# Patient Record
Sex: MALE | URBAN - METROPOLITAN AREA
[De-identification: names, ages, dates, MRNs, and addresses within clinical notes are randomized per-mention and may not be internally consistent; named-entity substitution may affect disease eponyms.]

---

## 2017-04-06 ENCOUNTER — OUTPATIENT (OUTPATIENT)
Dept: OUTPATIENT SERVICES | Facility: HOSPITAL | Age: 52
LOS: 1 days | End: 2017-04-06

## 2017-04-18 ENCOUNTER — OUTPATIENT (OUTPATIENT)
Dept: OUTPATIENT SERVICES | Facility: HOSPITAL | Age: 52
LOS: 1 days | End: 2017-04-18

## 2020-02-26 PROBLEM — Z00.00 ENCOUNTER FOR PREVENTIVE HEALTH EXAMINATION: Status: ACTIVE | Noted: 2020-02-26

## 2020-02-27 ENCOUNTER — APPOINTMENT (OUTPATIENT)
Dept: PULMONOLOGY | Facility: CLINIC | Age: 55
End: 2020-02-27
Payer: COMMERCIAL

## 2020-02-27 VITALS
SYSTOLIC BLOOD PRESSURE: 140 MMHG | BODY MASS INDEX: 37.15 KG/M2 | WEIGHT: 248 LBS | DIASTOLIC BLOOD PRESSURE: 84 MMHG | RESPIRATION RATE: 16 BRPM | HEIGHT: 68.5 IN | HEART RATE: 65 BPM | OXYGEN SATURATION: 98 %

## 2020-02-27 DIAGNOSIS — Z02.82 ENCOUNTER FOR ADOPTION SERVICES: ICD-10-CM

## 2020-02-27 DIAGNOSIS — F17.200 NICOTINE DEPENDENCE, UNSPECIFIED, UNCOMPLICATED: ICD-10-CM

## 2020-02-27 DIAGNOSIS — Z12.9 ENCOUNTER FOR SCREENING FOR MALIGNANT NEOPLASM, SITE UNSPECIFIED: ICD-10-CM

## 2020-02-27 DIAGNOSIS — R06.02 SHORTNESS OF BREATH: ICD-10-CM

## 2020-02-27 DIAGNOSIS — Z85.51 PERSONAL HISTORY OF MALIGNANT NEOPLASM OF BLADDER: ICD-10-CM

## 2020-02-27 PROCEDURE — 94664 DEMO&/EVAL PT USE INHALER: CPT | Mod: 59

## 2020-02-27 PROCEDURE — 94060 EVALUATION OF WHEEZING: CPT

## 2020-02-27 PROCEDURE — G0296 VISIT TO DETERM LDCT ELIG: CPT

## 2020-02-27 PROCEDURE — 94727 GAS DIL/WSHOT DETER LNG VOL: CPT

## 2020-02-27 PROCEDURE — 94729 DIFFUSING CAPACITY: CPT

## 2020-02-27 PROCEDURE — 85018 HEMOGLOBIN: CPT | Mod: QW

## 2020-02-27 PROCEDURE — 99204 OFFICE O/P NEW MOD 45 MIN: CPT | Mod: 25

## 2020-02-27 NOTE — PHYSICAL EXAM
[No Acute Distress] : no acute distress [Normal Oropharynx] : normal oropharynx [Normal Rate/Rhythm] : normal rate/rhythm [No Neck Mass] : no neck mass [Normal Appearance] : normal appearance [Normal S1, S2] : normal s1, s2 [No Resp Distress] : no resp distress [No Murmurs] : no murmurs [Clear to Auscultation Bilaterally] : clear to auscultation bilaterally [No Abnormalities] : no abnormalities [Normal Gait] : normal gait [Benign] : benign [No Clubbing] : no clubbing [No Edema] : no edema [No Cyanosis] : no cyanosis [FROM] : FROM [No Focal Deficits] : no focal deficits [Normal Color/ Pigmentation] : normal color/ pigmentation [Oriented x3] : oriented x3 [Normal Affect] : normal affect

## 2020-02-27 NOTE — HISTORY OF PRESENT ILLNESS
[TextBox_4] : 59 y old male with a history of bladder cancer in the distant past, greater than 50-pack-year history of cigarette smoking discontinued approximately 7 years ago. Currently vaping nicotine, seen today for pulmonary evaluation. Patient is complaining of mild shortness of breath with no seasonal or daytime variation. History is negative for chest pains, palpitations, lightheadedness, dizziness. He notes slight intermittent wheeze, which spontaneously resolves. He has not required the use of an inhaler steroids. History is negative for leg edema, paroxysmal nocturnal dyspnea, or orthopnea. No history of weight, change, or changes in voice. History is negative for exposures or change in environment

## 2020-02-27 NOTE — CONSULT LETTER
[Dear  ___] : Dear  [unfilled], [Consult Letter:] : I had the pleasure of evaluating your patient, [unfilled]. [Consult Closing:] : Thank you very much for allowing me to participate in the care of this patient.  If you have any questions, please do not hesitate to contact me. [Please see my note below.] : Please see my note below. [Sincerely,] : Sincerely, [FreeTextEntry3] : Paul Orta MD FCCP\par Pulmonary/Critical Care/Sleep Medicine\par Department of Internal Medicine\par \par [employer]\par

## 2020-02-27 NOTE — ASSESSMENT
[FreeTextEntry1] : Due to the patient's history of prior tobacco use they fulfill criteria for low dose, lung cancer screening. This method was described to the patient with criteria for greater than 30 pack years of smoking, over the age of 55, and screening for 15 years following cessation of tobacco use.\par

## 2020-03-11 ENCOUNTER — FORM ENCOUNTER (OUTPATIENT)
Age: 55
End: 2020-03-11

## 2020-03-12 ENCOUNTER — OUTPATIENT (OUTPATIENT)
Dept: OUTPATIENT SERVICES | Facility: HOSPITAL | Age: 55
LOS: 1 days | End: 2020-03-12
Payer: COMMERCIAL

## 2020-03-12 ENCOUNTER — APPOINTMENT (OUTPATIENT)
Dept: CT IMAGING | Facility: CLINIC | Age: 55
End: 2020-03-12

## 2020-03-12 VITALS — BODY MASS INDEX: 35.96 KG/M2 | HEIGHT: 68.5 IN | WEIGHT: 240 LBS

## 2020-03-12 DIAGNOSIS — Z12.9 ENCOUNTER FOR SCREENING FOR MALIGNANT NEOPLASM, SITE UNSPECIFIED: ICD-10-CM

## 2020-03-12 DIAGNOSIS — F17.210 NICOTINE DEPENDENCE, CIGARETTES, UNCOMPLICATED: ICD-10-CM

## 2020-03-12 DIAGNOSIS — Z87.891 PERSONAL HISTORY OF NICOTINE DEPENDENCE: ICD-10-CM

## 2020-03-12 PROCEDURE — G0297: CPT | Mod: 26

## 2020-03-12 PROCEDURE — G0297: CPT

## 2020-03-12 NOTE — PLAN
[Smoking Cessation Guidance Provided] : Smoking cessation guidance was provided to patient [FreeTextEntry1] : - Low Dose CT chest for lung cancer screening.\par \par - Encouraged continued smoking abstinence\par \par Engaged in share decision making with Mr. CHICAS.  Answered all questions.  He verbalized understanding and agreement.  He knows to call back with any questions or concerns.\par \par

## 2020-03-12 NOTE — HISTORY OF PRESENT ILLNESS
[TextBox_13] : Referred by Dr. Paul Orta.\par \par Mr. CHICAS is a 55 year old male with history of bladder cancer () s/p TURBT.\par \par He was seen in the office today for review of eligibility for, as well as, Low-Dose CT lung cancer screening.  Reviewed and confirmed that the patient meets screening eligibility criteria:\par \par 55 years old \par \par Smoking Status: Former Smoker\par \par Number of pack(s) per day: 2 ppd x 25 years, 1 ppd x 18 years\par Number of years smoked: 43\par Number of pack years smokin\par Patient currently vaping for the last 4 years.  Patient quit 2 weeks ago. \par Quit Year: \par \par Mr. CHICAS denies any symptoms of lung cancer, including new cough, change in cough, hemoptysis, and unintentional weight loss.\par \par Mr. CHICAS denies any personal history of lung cancer.  Unknown if lung cancer in a first degree relative.  Denies any history of lung disease or any history of occupational exposures.

## 2021-09-29 ENCOUNTER — NON-APPOINTMENT (OUTPATIENT)
Age: 56
End: 2021-09-29

## 2021-09-29 VITALS — HEIGHT: 68 IN | WEIGHT: 215 LBS | BODY MASS INDEX: 32.58 KG/M2

## 2021-09-29 NOTE — HISTORY OF PRESENT ILLNESS
[TextBox_13] : Referred by Dr. Micky Conti.\par \par Mr. CHICAS is a 56 year old male with a history of bladder CA s/p TURBT.\par \par He was called to review eligibility for Low-Dose CT lung cancer screening.  Reviewed and confirmed that the patient meets screening eligibility criteria:\par \par 56 years old \par \par Smoking Status: Former cigarette smoker \par  \par Number of pack(s) per day: 2 ppd x 25 years, 1 ppd x 18 years\par Number of years smoked: 43\par Number of pack years smokin\par Patient vapes x 5 years.\par \par Number of years since quitting smokin\par Quit year: 2020\par \par Mr. CHICAS denies any symptoms of lung cancer, including new cough, change in cough, hemoptysis, and unintentional weight loss.\par \par Mr. CHICAS denies any personal history of lung cancer.  Unknown if lung cancer in a first degree relative.  Denies any history of lung disease or any history of occupational exposures.

## 2021-10-22 ENCOUNTER — OUTPATIENT (OUTPATIENT)
Dept: OUTPATIENT SERVICES | Facility: HOSPITAL | Age: 56
LOS: 1 days | End: 2021-10-22
Payer: COMMERCIAL

## 2021-10-22 ENCOUNTER — APPOINTMENT (OUTPATIENT)
Dept: CT IMAGING | Facility: CLINIC | Age: 56
End: 2021-10-22
Payer: COMMERCIAL

## 2021-10-22 DIAGNOSIS — Z12.2 ENCOUNTER FOR SCREENING FOR MALIGNANT NEOPLASM OF RESPIRATORY ORGANS: ICD-10-CM

## 2021-10-22 PROCEDURE — 71271 CT THORAX LUNG CANCER SCR C-: CPT | Mod: 26

## 2021-10-22 PROCEDURE — 71271 CT THORAX LUNG CANCER SCR C-: CPT

## 2022-10-08 ENCOUNTER — APPOINTMENT (OUTPATIENT)
Dept: ORTHOPEDIC SURGERY | Facility: CLINIC | Age: 57
End: 2022-10-08

## 2022-10-08 VITALS
HEART RATE: 69 BPM | HEIGHT: 69 IN | BODY MASS INDEX: 31.84 KG/M2 | SYSTOLIC BLOOD PRESSURE: 152 MMHG | TEMPERATURE: 98.1 F | WEIGHT: 215 LBS | DIASTOLIC BLOOD PRESSURE: 91 MMHG

## 2022-10-08 DIAGNOSIS — M17.12 UNILATERAL PRIMARY OSTEOARTHRITIS, LEFT KNEE: ICD-10-CM

## 2022-10-08 PROCEDURE — 99203 OFFICE O/P NEW LOW 30 MIN: CPT

## 2022-10-08 PROCEDURE — 73562 X-RAY EXAM OF KNEE 3: CPT | Mod: LT

## 2022-10-08 RX ORDER — DICLOFENAC SODIUM 75 MG/1
75 TABLET, DELAYED RELEASE ORAL
Qty: 60 | Refills: 0 | Status: ACTIVE | COMMUNITY
Start: 2022-10-08 | End: 1900-01-01

## 2022-10-08 NOTE — ASSESSMENT
[FreeTextEntry1] : Patient presents with left knee pain. Their symptoms are consistent with patellofemoral arthrits of the left knee. The nature of this condition was described at length with the patient and they verbalized understanding. \par \par Recommendations: \par -Diclofenac when flareups of knee pain occur. Currently, no pain. \par -May come in for a cortisone injection if needed, currently, I do not recommend this, as he is having little pain.\par -Discussion had about gel injections. Patient may be a candidate soon for this.\par -Followup with Dr. Merchant as needed\par -Patient was given ample time to ask questions and all questions were answered to the patient's full satisfaction.\par \par The patient was in full agreement with this plan and appreciative of their care.\par

## 2022-10-08 NOTE — PHYSICAL EXAM
[UE/LE] : Sensory: Intact in bilateral upper & lower extremities [ALL] : dorsalis pedis, posterior tibial, femoral, popliteal, and radial 2+ and symmetric bilaterally [Normal Finger/nose] : finger to nose coordination [Normal] : no peripheral adenopathy appreciated [Poor Appearance] : well-appearing [Acute Distress] : not in acute distress [de-identified] : GERSONR [de-identified] : Left Knee Exam\par \par FROM to hip\par \par Skin Warm, Dry, no erythema, no lesions \par \par Knee \par stable\par anatomic alignment\par ROM 0-130\par Valgus/Varus Stress intact \par Effusion - Negative\par Crepitus - Negative \par Patella Grind - Mildly positive \par Patella Apprehension - Negative \par Medial joint line tenderness - Negative\par Lateral Joint line tenderness - Negative\par Charley Test - Negative  \par Lachman test - Negative \par Anterior Drawer Test -  Negative \par \par Distal Motor Function intact \par Sensation intact to light touch bilaterally \par Pulses 2+ bilaterally\par \par  [de-identified] : 3 xray views of the left knee were obtained.\par \par -No fractures or dislocations\par -Mild patellofemoral arthritis

## 2022-10-08 NOTE — HISTORY OF PRESENT ILLNESS
[de-identified] : 10/08/2022: Patient is a 57 year-old male who presents to the office today for initial evaluation of left knee pain. 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 Pain has been present for the past few years. He denies any injury or inciting event. His pain is located diffusely around the knee. He does exercising with walking/jogging which aggravates the knee pain. He notes that it worsened the other day when carrying an AC unit down a set of stairs. Going up and down stairs is difficult normally. Today, he notes that the pain is not that bad. He does get symptoms of instability from time to time. Currently, he takes Osteo-Biflex. He does not take any over-the-counter medications to alleviate his symptoms. \par \par The patient's past medical history, past surgical history, medications and allergies were reviewed by me today and documented accordingly. In addition, the patient's family and social history, which were non-contributory to this visit, were also reviewed. Intake form was reviewed.\par \par

## 2022-10-25 ENCOUNTER — APPOINTMENT (OUTPATIENT)
Dept: ORTHOPEDIC SURGERY | Facility: CLINIC | Age: 57
End: 2022-10-25

## 2022-10-25 VITALS
WEIGHT: 210 LBS | BODY MASS INDEX: 30.06 KG/M2 | OXYGEN SATURATION: 96 % | SYSTOLIC BLOOD PRESSURE: 111 MMHG | HEART RATE: 63 BPM | HEIGHT: 70 IN | DIASTOLIC BLOOD PRESSURE: 75 MMHG

## 2022-10-25 DIAGNOSIS — M25.562 PAIN IN LEFT KNEE: ICD-10-CM

## 2022-10-25 PROCEDURE — 20611 DRAIN/INJ JOINT/BURSA W/US: CPT | Mod: LT

## 2022-10-25 PROCEDURE — 99214 OFFICE O/P EST MOD 30 MIN: CPT | Mod: 25

## 2022-10-30 PROBLEM — M25.562 LEFT KNEE PAIN: Status: ACTIVE | Noted: 2022-10-07

## 2022-11-18 ENCOUNTER — NON-APPOINTMENT (OUTPATIENT)
Age: 57
End: 2022-11-18

## 2022-11-18 VITALS — BODY MASS INDEX: 30.06 KG/M2 | WEIGHT: 210 LBS | HEIGHT: 70 IN

## 2022-11-18 NOTE — HISTORY OF PRESENT ILLNESS
[Former] : Former [TextBox_13] : Mr. CHICAS is a 57 year old male with a history of bladder CA s/p TURBT. Reviewed and confirmed that the patient meets screening eligibility criteria:\par \par 57 years old \par \par Smoking Status: Former cigarette smoker \par  \par Number of pack(s) per day: 2 ppd x 25 years, 1 ppd x 18 years\par Number of years smoked: 43\par Number of pack years smokin\par \par Number of years since quitting smokin\par Quit year: \par \par Mr. CHICAS denies any symptoms of lung cancer, including new cough, change in cough, hemoptysis, and unintentional weight loss.\par \par No personal history of lung cancer. Unknown if lung cancer in a first degree relative. No history of lung disease or occupational exposures.  [YearQuit] : 2020 [PacksperYear] : 68

## 2022-11-19 ENCOUNTER — APPOINTMENT (OUTPATIENT)
Dept: CT IMAGING | Facility: CLINIC | Age: 57
End: 2022-11-19

## 2022-11-30 ENCOUNTER — APPOINTMENT (OUTPATIENT)
Dept: ORTHOPEDIC SURGERY | Facility: CLINIC | Age: 57
End: 2022-11-30

## 2022-11-30 VITALS
HEART RATE: 67 BPM | HEIGHT: 70 IN | SYSTOLIC BLOOD PRESSURE: 105 MMHG | BODY MASS INDEX: 30.06 KG/M2 | DIASTOLIC BLOOD PRESSURE: 70 MMHG | WEIGHT: 210 LBS

## 2022-11-30 DIAGNOSIS — S76.312A STRAIN OF MUSCLE, FASCIA AND TENDON OF THE POSTERIOR MUSCLE GROUP AT THIGH LEVEL, LEFT THIGH, INITIAL ENCOUNTER: ICD-10-CM

## 2022-11-30 DIAGNOSIS — M25.559 PAIN IN UNSPECIFIED HIP: ICD-10-CM

## 2022-11-30 PROCEDURE — 73552 X-RAY EXAM OF FEMUR 2/>: CPT | Mod: LT

## 2022-11-30 PROCEDURE — 99213 OFFICE O/P EST LOW 20 MIN: CPT

## 2022-11-30 NOTE — HISTORY OF PRESENT ILLNESS
[Improving] : improving [Intermit.] : ~He/She~ states the symptoms seem to be intermittent [Sitting] : worsened by sitting [Acetaminophen] : relieved by acetaminophen [Exercise Regimen] : relieved by exercise regimen [NSAIDs] : relieved by nonsteroidal anti-inflammatory drugs [de-identified] : 7-year-old male with no significant past medical history presents to the office for initial evaluation of left thigh pain due to a hamstring strain 1 month ago.  The patient states that he was wrestling and felt the pain in his left glutes and hamstring.  Initially he had difficulty walking but has been doing at home exercise and stretches and his pain is significantly proved since injury.  He is now back to work able to walk without difficulty and perform his activities of daily living.  Does still have pain if he is sitting and driving for more than 20 minutes.  Takes ibuprofen as needed for the pain with good relief.  Denies use of assistive device for ambulation.  Has not been to formal physical therapy.  Denies any numbness or tingling in the left lower extremity, pain in the left hip, pain in the back, swelling in the left lower extremity.  Does admit to pain in the left knee for which she was seen in October and received a cortisone injection. \par \par Admits to vaping, denies smoking drug and alcohol use.

## 2022-11-30 NOTE — DISCUSSION/SUMMARY
[Medication Risks Reviewed] : Medication risks reviewed [de-identified] : Patient is a 49-year-old male with a left hamstring strain presenting today for initial evaluation.  He is having minimal symptoms at this time.  I therefore recommended conservative treatment.  I gave him prescription for physical therapy.  I have recommended meloxicam 15 mg daily as needed for pain.  I recommended activity modification.  I will see him back on an as-needed basis for his left hip.  All questions were asked and answered.

## 2022-11-30 NOTE — PHYSICAL EXAM
[de-identified] : GENERAL APPEARANCE: Well nourished and hydrated, pleasant, alert, and oriented x 3. Appears their stated age. \par HEENT: Normocephalic, extraocular eye motion intact. Nasal septum midline. Oral cavity clear. External auditory canal clear. \par RESPIRATORY: Breath sounds clear and audible in all lobes. No wheezing, No accessory muscle use.\par CARDIOVASCULAR: No apparent abnormalities. No lower leg edema. No varicosities. Pedal pulses are palpable.\par NEUROLOGIC: Sensation is normal, no muscle weakness in the upper or lower extremities.\par DERMATOLOGIC: No apparent skin lesions, moist, warm, no rash.\par SPINE: Cervical spine appears normal and moves freely; thoracic spine appears normal and moves freely; lumbosacral spine appears normal and moves freely, normal, nontender.\par MUSCULOSKELETAL: Hands, wrists, and elbows are normal and move freely, shoulders are normal and move freely. \par Psychiatric: Oriented to person, place, and time, insight and judgement were intact and the affect was normal. \par Musculoskeletal: ambulates normally. Left hip exam showed no groin pain with SLR, ROM is full without pain, ARIA negative, FADIR negative.  There is mild posterior pain with straight leg raise.  There is tenderness palpation over the ischial tuberosity.\par 5/5 motor strength in bilateral lower extremities. Sensory: Intact in bilateral lower extremities. DTRs: Biceps, brachioradialis, triceps, patellar, ankle and plantar 2+ and symmetric bilaterally. Pulses: dorsalis pedis, posterior tibial, femoral, popliteal, and radial 2+ and symmetric bilaterally. \par  [de-identified] : 3 views of the left femur obtained the office today show no acute fracture or dislocation.  No significant degenerative changes of the hip noted.

## 2022-12-16 ENCOUNTER — OUTPATIENT (OUTPATIENT)
Dept: OUTPATIENT SERVICES | Facility: HOSPITAL | Age: 57
LOS: 1 days | End: 2022-12-16
Payer: COMMERCIAL

## 2022-12-16 ENCOUNTER — APPOINTMENT (OUTPATIENT)
Dept: CT IMAGING | Facility: CLINIC | Age: 57
End: 2022-12-16

## 2022-12-16 DIAGNOSIS — Z00.8 ENCOUNTER FOR OTHER GENERAL EXAMINATION: ICD-10-CM

## 2022-12-16 PROCEDURE — 71271 CT THORAX LUNG CANCER SCR C-: CPT | Mod: 26

## 2022-12-16 PROCEDURE — 71271 CT THORAX LUNG CANCER SCR C-: CPT

## 2023-01-03 ENCOUNTER — RX RENEWAL (OUTPATIENT)
Age: 58
End: 2023-01-03

## 2023-01-03 RX ORDER — MELOXICAM 15 MG/1
15 TABLET ORAL
Qty: 30 | Refills: 0 | Status: ACTIVE | COMMUNITY
Start: 2022-12-01 | End: 1900-01-01

## 2024-02-15 ENCOUNTER — NON-APPOINTMENT (OUTPATIENT)
Age: 59
End: 2024-02-15

## 2024-02-15 VITALS — WEIGHT: 210 LBS | HEIGHT: 70 IN | BODY MASS INDEX: 30.06 KG/M2

## 2024-02-15 DIAGNOSIS — Z87.891 PERSONAL HISTORY OF NICOTINE DEPENDENCE: ICD-10-CM

## 2024-02-15 NOTE — HISTORY OF PRESENT ILLNESS
[Former] : Former [TextBox_13] : Mr. CHICAS is a 59 year old male with a history of bladder CA s/p TURBT. Reviewed and confirmed that the patient meets screening eligibility criteria:  Smoking Status: Former cigarette smoker    Number of pack(s) per day: 2 ppd x 25 years, 1 ppd x 18 years Number of years smoked: 43 Number of pack years smokin  Number of years since quitting smokin Quit year:   Mr. CHICAS denies any symptoms of lung cancer, including new cough, change in cough, hemoptysis, and unintentional weight loss.  No personal history of lung cancer. Unknown if lung cancer in a first degree relative. No history of lung disease or occupational exposures.   [YearQuit] : 2020 [PacksperYear] : 67

## 2024-03-01 ENCOUNTER — APPOINTMENT (OUTPATIENT)
Dept: CT IMAGING | Facility: CLINIC | Age: 59
End: 2024-03-01

## 2024-03-05 ENCOUNTER — APPOINTMENT (OUTPATIENT)
Dept: CT IMAGING | Facility: CLINIC | Age: 59
End: 2024-03-05
Payer: COMMERCIAL

## 2024-03-05 ENCOUNTER — OUTPATIENT (OUTPATIENT)
Dept: OUTPATIENT SERVICES | Facility: HOSPITAL | Age: 59
LOS: 1 days | End: 2024-03-05
Payer: COMMERCIAL

## 2024-03-05 ENCOUNTER — OUTPATIENT (OUTPATIENT)
Dept: OUTPATIENT SERVICES | Facility: HOSPITAL | Age: 59
LOS: 1 days | End: 2024-03-05
Payer: SELF-PAY

## 2024-03-05 ENCOUNTER — APPOINTMENT (OUTPATIENT)
Dept: CT IMAGING | Facility: CLINIC | Age: 59
End: 2024-03-05
Payer: SELF-PAY

## 2024-03-05 DIAGNOSIS — Z87.891 PERSONAL HISTORY OF NICOTINE DEPENDENCE: ICD-10-CM

## 2024-03-05 DIAGNOSIS — Z00.8 ENCOUNTER FOR OTHER GENERAL EXAMINATION: ICD-10-CM

## 2024-03-05 PROCEDURE — 75571 CT HRT W/O DYE W/CA TEST: CPT | Mod: 26

## 2024-03-05 PROCEDURE — 71271 CT THORAX LUNG CANCER SCR C-: CPT | Mod: 26

## 2024-03-05 PROCEDURE — 71271 CT THORAX LUNG CANCER SCR C-: CPT

## 2024-03-05 PROCEDURE — 75571 CT HRT W/O DYE W/CA TEST: CPT

## 2025-04-21 ENCOUNTER — NON-APPOINTMENT (OUTPATIENT)
Age: 60
End: 2025-04-21

## 2025-04-21 VITALS — BODY MASS INDEX: 30.06 KG/M2 | WEIGHT: 210 LBS | HEIGHT: 70 IN

## 2025-04-21 DIAGNOSIS — Z87.891 PERSONAL HISTORY OF NICOTINE DEPENDENCE: ICD-10-CM

## 2025-04-28 ENCOUNTER — APPOINTMENT (OUTPATIENT)
Dept: ORTHOPEDIC SURGERY | Facility: CLINIC | Age: 60
End: 2025-04-28
Payer: COMMERCIAL

## 2025-04-28 DIAGNOSIS — M17.12 UNILATERAL PRIMARY OSTEOARTHRITIS, LEFT KNEE: ICD-10-CM

## 2025-04-28 PROCEDURE — 20610 DRAIN/INJ JOINT/BURSA W/O US: CPT | Mod: LT

## 2025-04-28 PROCEDURE — 73564 X-RAY EXAM KNEE 4 OR MORE: CPT | Mod: LT

## 2025-04-28 PROCEDURE — 99214 OFFICE O/P EST MOD 30 MIN: CPT | Mod: 25

## 2025-04-29 ENCOUNTER — APPOINTMENT (OUTPATIENT)
Dept: CT IMAGING | Facility: CLINIC | Age: 60
End: 2025-04-29

## 2025-04-29 ENCOUNTER — OUTPATIENT (OUTPATIENT)
Dept: OUTPATIENT SERVICES | Facility: HOSPITAL | Age: 60
LOS: 1 days | End: 2025-04-29
Payer: COMMERCIAL

## 2025-04-29 DIAGNOSIS — Z87.891 PERSONAL HISTORY OF NICOTINE DEPENDENCE: ICD-10-CM

## 2025-04-29 DIAGNOSIS — Z00.8 ENCOUNTER FOR OTHER GENERAL EXAMINATION: ICD-10-CM

## 2025-04-29 PROCEDURE — 71271 CT THORAX LUNG CANCER SCR C-: CPT | Mod: 26

## 2025-04-29 PROCEDURE — 71271 CT THORAX LUNG CANCER SCR C-: CPT

## 2025-06-23 ENCOUNTER — APPOINTMENT (OUTPATIENT)
Dept: ORTHOPEDIC SURGERY | Facility: CLINIC | Age: 60
End: 2025-06-23
Payer: COMMERCIAL

## 2025-06-23 VITALS
DIASTOLIC BLOOD PRESSURE: 79 MMHG | BODY MASS INDEX: 31.5 KG/M2 | HEIGHT: 70 IN | SYSTOLIC BLOOD PRESSURE: 129 MMHG | WEIGHT: 220 LBS

## 2025-06-23 PROBLEM — M17.11 ARTHRITIS OF KNEE, RIGHT: Status: ACTIVE | Noted: 2025-06-23

## 2025-06-23 PROCEDURE — 73564 X-RAY EXAM KNEE 4 OR MORE: CPT | Mod: RT

## 2025-06-23 PROCEDURE — 20610 DRAIN/INJ JOINT/BURSA W/O US: CPT | Mod: RT

## 2025-06-23 PROCEDURE — 99214 OFFICE O/P EST MOD 30 MIN: CPT | Mod: 25

## 2025-06-23 RX ORDER — MELOXICAM 15 MG/1
15 TABLET ORAL
Qty: 30 | Refills: 0 | Status: ACTIVE | COMMUNITY
Start: 2025-06-23 | End: 1900-01-01

## 2025-07-28 ENCOUNTER — APPOINTMENT (OUTPATIENT)
Dept: ORTHOPEDIC SURGERY | Facility: CLINIC | Age: 60
End: 2025-07-28
Payer: COMMERCIAL

## 2025-07-28 VITALS
WEIGHT: 220 LBS | SYSTOLIC BLOOD PRESSURE: 126 MMHG | HEIGHT: 70 IN | BODY MASS INDEX: 31.5 KG/M2 | DIASTOLIC BLOOD PRESSURE: 85 MMHG

## 2025-07-28 DIAGNOSIS — M17.12 UNILATERAL PRIMARY OSTEOARTHRITIS, LEFT KNEE: ICD-10-CM

## 2025-07-28 PROCEDURE — 20610 DRAIN/INJ JOINT/BURSA W/O US: CPT | Mod: LT

## 2025-07-28 PROCEDURE — 99214 OFFICE O/P EST MOD 30 MIN: CPT | Mod: 25
